# Patient Record
Sex: MALE | Race: WHITE | NOT HISPANIC OR LATINO | URBAN - METROPOLITAN AREA
[De-identification: names, ages, dates, MRNs, and addresses within clinical notes are randomized per-mention and may not be internally consistent; named-entity substitution may affect disease eponyms.]

---

## 2022-07-09 ENCOUNTER — TELEPHONE ENCOUNTER (OUTPATIENT)
Dept: URBAN - METROPOLITAN AREA CLINIC 121 | Facility: CLINIC | Age: 52
End: 2022-07-09

## 2022-07-09 RX ORDER — CEPHALEXIN 500 MG/1
TABLET ORAL
Refills: 0 | OUTPATIENT
Start: 2016-08-02 | End: 2017-02-15

## 2022-07-09 RX ORDER — DICYCLOMINE HYDROCHLORIDE 20 MG/1
FOUR TIMES A DAY TABLET ORAL
Refills: 0 | OUTPATIENT
Start: 2016-09-15 | End: 2017-02-15

## 2022-07-09 RX ORDER — CIPROFLOXACIN HCL 500 MG
TABLET ORAL TWICE A DAY
Refills: 0 | OUTPATIENT
Start: 2017-02-15 | End: 2017-03-02

## 2022-07-09 RX ORDER — HYDROCORTISONE ACETATE 25 MG
TWICE A DAY SUPPOSITORY, RECTAL RECTAL TWICE A DAY
Refills: 2 | OUTPATIENT
Start: 2017-02-15 | End: 2017-03-02

## 2022-07-09 RX ORDER — LORATADINE/PSEUDOEPHEDRINE 5 MG-120MG
TABLET, EXTENDED RELEASE 12 HR ORAL TAKE AS DIRECTED
Refills: 0 | OUTPATIENT
Start: 2016-08-02 | End: 2017-03-02

## 2022-07-10 ENCOUNTER — TELEPHONE ENCOUNTER (OUTPATIENT)
Dept: URBAN - METROPOLITAN AREA CLINIC 121 | Facility: CLINIC | Age: 52
End: 2022-07-10

## 2022-07-10 RX ORDER — CIPROFLOXACIN HCL 500 MG
TABLET ORAL TWICE A DAY
Refills: 0 | Status: ACTIVE | COMMUNITY
Start: 2017-03-02

## 2022-07-10 RX ORDER — LORATADINE/PSEUDOEPHEDRINE 5 MG-120MG
TABLET, EXTENDED RELEASE 12 HR ORAL TAKE AS DIRECTED
Refills: 0 | Status: ACTIVE | COMMUNITY
Start: 2017-03-02

## 2023-05-17 PROBLEM — 118361000119100: Status: ACTIVE | Noted: 2023-05-17

## 2023-05-18 ENCOUNTER — WEB ENCOUNTER (OUTPATIENT)
Dept: URBAN - METROPOLITAN AREA CLINIC 63 | Facility: CLINIC | Age: 53
End: 2023-05-18

## 2023-05-23 ENCOUNTER — OFFICE VISIT (OUTPATIENT)
Dept: URBAN - METROPOLITAN AREA CLINIC 63 | Facility: CLINIC | Age: 53
End: 2023-05-23
Payer: COMMERCIAL

## 2023-05-23 ENCOUNTER — DASHBOARD ENCOUNTERS (OUTPATIENT)
Age: 53
End: 2023-05-23

## 2023-05-23 ENCOUNTER — LAB OUTSIDE AN ENCOUNTER (OUTPATIENT)
Dept: URBAN - METROPOLITAN AREA CLINIC 63 | Facility: CLINIC | Age: 53
End: 2023-05-23

## 2023-05-23 VITALS
HEIGHT: 67 IN | BODY MASS INDEX: 27.56 KG/M2 | OXYGEN SATURATION: 99 % | HEART RATE: 73 BPM | WEIGHT: 175.6 LBS | DIASTOLIC BLOOD PRESSURE: 90 MMHG | TEMPERATURE: 97.3 F | SYSTOLIC BLOOD PRESSURE: 134 MMHG

## 2023-05-23 DIAGNOSIS — Z87.19 HX OF DIVERTICULITIS OF COLON: ICD-10-CM

## 2023-05-23 DIAGNOSIS — Z12.11 ENCOUNTER FOR SCREENING FOR MALIGNANT NEOPLASM OF COLON: ICD-10-CM

## 2023-05-23 DIAGNOSIS — R10.32 LEFT LOWER QUADRANT ABDOMINAL PAIN: ICD-10-CM

## 2023-05-23 PROCEDURE — 99204 OFFICE O/P NEW MOD 45 MIN: CPT | Performed by: INTERNAL MEDICINE

## 2023-05-23 RX ORDER — MONTELUKAST SODIUM 10 MG/1
TABLET, COATED ORAL
Qty: 90 TABLET | Status: ACTIVE | COMMUNITY

## 2023-05-23 RX ORDER — ROSUVASTATIN CALCIUM 10 MG/1
TAKE 1 TABLET BY MOUTH EVERY DAY AT NIGHT TABLET, FILM COATED ORAL
Qty: 90 EACH | Refills: 3 | Status: ACTIVE | COMMUNITY

## 2023-05-23 RX ORDER — ONDANSETRON HYDROCHLORIDE 4 MG/1
1 TABLET TABLET, FILM COATED ORAL
Qty: 2 | Refills: 0 | OUTPATIENT
Start: 2023-05-17

## 2023-05-23 RX ORDER — TAMSULOSIN HYDROCHLORIDE 0.4 MG/1
CAPSULE ORAL
Qty: 90 CAPSULE | Status: ACTIVE | COMMUNITY

## 2023-05-23 NOTE — HPI-TODAY'S VISIT:
Stephen is a pleasant 52-year-old gentleman with a history of diverticulitis for which she had a sigmoid colon resection sometime prior to 2016.  We saw him for colonoscopy in 2016 which revealed left-sided diverticular disease and an end-to-end anastomosis on the left side.  He also has a history of an appendectomy.  Reason listed for visit is "colon screening". Today, 5/23/2023, Stephen reports that he has been having some intermittent left lower quadrant pain.  We will build up during the day and sometimes be improved following a bowel movement.  He is developed a tendency towards looser stool and also has urgency of defecation.  He works at home so has been able to deal with that but it is a change for him.  He reports having had a surgery for intra-abdominal adhesions prior to his colon resection for diverticulitis.  We discussed the differential diagnosis which includes intra-abdominal adhesions, bowel spasm, anastomotic narrowing with partial obstruction, or functional disorder/IBS.  Malignancy should be excluded as well.  We will proceed with a CAT scan of the abdomen and pelvis and colonoscopy.

## 2023-05-30 ENCOUNTER — WEB ENCOUNTER (OUTPATIENT)
Dept: URBAN - METROPOLITAN AREA SURGERY CENTER 4 | Facility: SURGERY CENTER | Age: 53
End: 2023-05-30

## 2023-05-30 ENCOUNTER — LAB OUTSIDE AN ENCOUNTER (OUTPATIENT)
Dept: URBAN - METROPOLITAN AREA CLINIC 63 | Facility: CLINIC | Age: 53
End: 2023-05-30

## 2023-06-01 ENCOUNTER — OFFICE VISIT (OUTPATIENT)
Dept: URBAN - METROPOLITAN AREA SURGERY CENTER 4 | Facility: SURGERY CENTER | Age: 53
End: 2023-06-01
Payer: COMMERCIAL

## 2023-06-01 ENCOUNTER — CLAIMS CREATED FROM THE CLAIM WINDOW (OUTPATIENT)
Dept: URBAN - METROPOLITAN AREA CLINIC 4 | Facility: CLINIC | Age: 53
End: 2023-06-01
Payer: COMMERCIAL

## 2023-06-01 DIAGNOSIS — Z87.19 HX OF DIVERTICULITIS OF COLON: ICD-10-CM

## 2023-06-01 DIAGNOSIS — K64.1 BLEEDING GRADE II HEMORRHOIDS: ICD-10-CM

## 2023-06-01 DIAGNOSIS — R19.7 ACUTE DIARRHEA: ICD-10-CM

## 2023-06-01 DIAGNOSIS — R19.7 DIARRHEA: ICD-10-CM

## 2023-06-01 DIAGNOSIS — Z12.11 ENCOUNTER FOR SCREENING FOR MALIGNANT NEOPLASM OF COLON: ICD-10-CM

## 2023-06-01 DIAGNOSIS — K57.30 COLON, DIVERTICULOSIS: ICD-10-CM

## 2023-06-01 DIAGNOSIS — R19.7 DIARRHEA, UNSPECIFIED: ICD-10-CM

## 2023-06-01 DIAGNOSIS — K64.1 GRADE II HEMORRHOIDS: ICD-10-CM

## 2023-06-01 DIAGNOSIS — K57.30 DIVERTCULOSIS OF LG INT W/O PERFORATION OR ABSCESS W/O BLEEDING: ICD-10-CM

## 2023-06-01 DIAGNOSIS — K63.89 OTHER SPECIFIED DISEASES OF INTESTINE: ICD-10-CM

## 2023-06-01 PROCEDURE — 00812 ANES LWR INTST SCR COLSC: CPT | Performed by: NURSE ANESTHETIST, CERTIFIED REGISTERED

## 2023-06-01 PROCEDURE — 88313 SPECIAL STAINS GROUP 2: CPT | Performed by: PATHOLOGY

## 2023-06-01 PROCEDURE — 45380 COLONOSCOPY AND BIOPSY: CPT | Performed by: INTERNAL MEDICINE

## 2023-06-01 PROCEDURE — 88305 TISSUE EXAM BY PATHOLOGIST: CPT | Performed by: PATHOLOGY

## 2023-06-01 PROCEDURE — 88342 IMHCHEM/IMCYTCHM 1ST ANTB: CPT | Performed by: PATHOLOGY

## 2023-06-01 RX ORDER — ROSUVASTATIN CALCIUM 10 MG/1
TAKE 1 TABLET BY MOUTH EVERY DAY AT NIGHT TABLET, FILM COATED ORAL
Qty: 90 EACH | Refills: 3 | Status: ACTIVE | COMMUNITY

## 2023-06-01 RX ORDER — MONTELUKAST SODIUM 10 MG/1
TABLET, COATED ORAL
Qty: 90 TABLET | Status: ACTIVE | COMMUNITY

## 2023-06-01 RX ORDER — ONDANSETRON HYDROCHLORIDE 4 MG/1
1 TABLET TABLET, FILM COATED ORAL
Qty: 2 | Refills: 0 | Status: ACTIVE | COMMUNITY
Start: 2023-05-17

## 2023-06-01 RX ORDER — TAMSULOSIN HYDROCHLORIDE 0.4 MG/1
CAPSULE ORAL
Qty: 90 CAPSULE | Status: ACTIVE | COMMUNITY

## 2023-07-17 ENCOUNTER — TELEPHONE ENCOUNTER (OUTPATIENT)
Dept: URBAN - METROPOLITAN AREA CLINIC 63 | Facility: CLINIC | Age: 53
End: 2023-07-17